# Patient Record
Sex: MALE | Race: WHITE | NOT HISPANIC OR LATINO | Employment: FULL TIME | URBAN - METROPOLITAN AREA
[De-identification: names, ages, dates, MRNs, and addresses within clinical notes are randomized per-mention and may not be internally consistent; named-entity substitution may affect disease eponyms.]

---

## 2023-02-14 PROBLEM — N52.9 ED (ERECTILE DYSFUNCTION): Status: ACTIVE | Noted: 2023-02-14

## 2023-02-14 PROBLEM — K21.9 GERD (GASTROESOPHAGEAL REFLUX DISEASE): Status: ACTIVE | Noted: 2023-02-14

## 2023-02-14 PROBLEM — J06.9 URI (UPPER RESPIRATORY INFECTION): Status: ACTIVE | Noted: 2023-02-14

## 2023-02-14 PROBLEM — S22.49XD: Status: ACTIVE | Noted: 2023-02-14

## 2023-02-14 PROBLEM — I10 BENIGN ESSENTIAL HYPERTENSION: Status: ACTIVE | Noted: 2023-02-14

## 2023-02-14 PROBLEM — G56.22 NEURITIS OF LEFT ULNAR NERVE: Status: ACTIVE | Noted: 2023-02-14

## 2023-02-14 PROBLEM — R19.7 DIARRHEA: Status: ACTIVE | Noted: 2023-02-14

## 2023-02-14 PROBLEM — M23.91 INTERNAL DERANGEMENT OF RIGHT KNEE: Status: ACTIVE | Noted: 2023-02-14

## 2023-02-14 PROBLEM — M19.011 ARTHRITIS OF RIGHT ACROMIOCLAVICULAR JOINT: Status: ACTIVE | Noted: 2023-02-14

## 2023-02-14 PROBLEM — G45.9 TIA (TRANSIENT ISCHEMIC ATTACK): Status: ACTIVE | Noted: 2023-02-14

## 2023-02-14 PROBLEM — R22.30 MASS OF HAND: Status: ACTIVE | Noted: 2023-02-14

## 2023-02-14 PROBLEM — M25.862 MASS OF JOINT OF LEFT KNEE: Status: ACTIVE | Noted: 2023-02-14

## 2023-02-14 PROBLEM — I48.0 PAROXYSMAL ATRIAL FIBRILLATION (MULTI): Status: ACTIVE | Noted: 2023-02-14

## 2023-02-14 PROBLEM — L50.8 URTICARIA, ACUTE: Status: ACTIVE | Noted: 2023-02-14

## 2023-02-14 PROBLEM — M25.562 LEFT KNEE PAIN: Status: ACTIVE | Noted: 2023-02-14

## 2023-02-14 PROBLEM — J01.90 ACUTE SINUSITIS: Status: ACTIVE | Noted: 2023-02-14

## 2023-02-14 PROBLEM — E78.00 HYPERCHOLESTEROLEMIA: Status: ACTIVE | Noted: 2023-02-14

## 2023-02-14 PROBLEM — R91.8 LUNG NODULES: Status: ACTIVE | Noted: 2023-02-14

## 2023-02-14 PROBLEM — G62.9 PERIPHERAL NEUROPATHY: Status: ACTIVE | Noted: 2023-02-14

## 2023-02-14 RX ORDER — LOSARTAN POTASSIUM 100 MG/1
1 TABLET ORAL DAILY
COMMUNITY
Start: 2022-11-03 | End: 2023-04-04 | Stop reason: DRUGHIGH

## 2023-02-14 RX ORDER — OMEPRAZOLE 20 MG/1
CAPSULE, DELAYED RELEASE ORAL
COMMUNITY
Start: 2014-12-02

## 2023-02-14 RX ORDER — ASPIRIN 81 MG/1
1 TABLET ORAL DAILY
COMMUNITY
Start: 2022-09-20 | End: 2024-04-08

## 2023-02-14 RX ORDER — ROSUVASTATIN CALCIUM 20 MG/1
1 TABLET, COATED ORAL DAILY
COMMUNITY
Start: 2022-11-02 | End: 2024-04-08

## 2024-03-12 ENCOUNTER — TELEPHONE (OUTPATIENT)
Dept: PRIMARY CARE | Facility: CLINIC | Age: 59
End: 2024-03-12
Payer: COMMERCIAL

## 2024-03-12 NOTE — TELEPHONE ENCOUNTER
Tommy is scheduled for a visit on 4/8 and wants to know if he can get a refill for Losartan 100-12.5 and Eliquis 5mg to his [facility] pharmacy on file

## 2024-03-13 DIAGNOSIS — I10 BENIGN ESSENTIAL HYPERTENSION: Primary | ICD-10-CM

## 2024-03-13 DIAGNOSIS — I48.0 PAROXYSMAL ATRIAL FIBRILLATION (MULTI): ICD-10-CM

## 2024-03-13 RX ORDER — LOSARTAN POTASSIUM AND HYDROCHLOROTHIAZIDE 12.5; 1 MG/1; MG/1
1 TABLET ORAL DAILY
Qty: 90 TABLET | Refills: 3 | Status: SHIPPED | OUTPATIENT
Start: 2024-03-13 | End: 2025-03-13

## 2024-04-08 ENCOUNTER — OFFICE VISIT (OUTPATIENT)
Dept: PRIMARY CARE | Facility: CLINIC | Age: 59
End: 2024-04-08
Payer: COMMERCIAL

## 2024-04-08 ENCOUNTER — TELEPHONE (OUTPATIENT)
Dept: PRIMARY CARE | Facility: CLINIC | Age: 59
End: 2024-04-08

## 2024-04-08 VITALS
TEMPERATURE: 98.6 F | WEIGHT: 257.2 LBS | SYSTOLIC BLOOD PRESSURE: 138 MMHG | HEART RATE: 70 BPM | DIASTOLIC BLOOD PRESSURE: 76 MMHG | BODY MASS INDEX: 34.88 KG/M2 | OXYGEN SATURATION: 96 %

## 2024-04-08 DIAGNOSIS — I48.0 PAROXYSMAL ATRIAL FIBRILLATION (MULTI): ICD-10-CM

## 2024-04-08 DIAGNOSIS — Z79.899 HIGH RISK MEDICATION USE: ICD-10-CM

## 2024-04-08 DIAGNOSIS — R06.83 SNORES: ICD-10-CM

## 2024-04-08 DIAGNOSIS — G45.9 TIA (TRANSIENT ISCHEMIC ATTACK): ICD-10-CM

## 2024-04-08 DIAGNOSIS — R40.0 DAYTIME SOMNOLENCE: ICD-10-CM

## 2024-04-08 DIAGNOSIS — Z11.4 ENCOUNTER FOR SCREENING FOR HIV: ICD-10-CM

## 2024-04-08 DIAGNOSIS — K22.719 BARRETT'S ESOPHAGUS WITH DYSPLASIA: ICD-10-CM

## 2024-04-08 DIAGNOSIS — I10 BENIGN ESSENTIAL HYPERTENSION: Primary | ICD-10-CM

## 2024-04-08 DIAGNOSIS — N52.2 DRUG-INDUCED ERECTILE DYSFUNCTION: ICD-10-CM

## 2024-04-08 DIAGNOSIS — Z11.59 ENCOUNTER FOR HEPATITIS C SCREENING TEST FOR LOW RISK PATIENT: ICD-10-CM

## 2024-04-08 DIAGNOSIS — K63.5 POLYP OF COLON, UNSPECIFIED PART OF COLON, UNSPECIFIED TYPE: ICD-10-CM

## 2024-04-08 DIAGNOSIS — E78.00 HYPERCHOLESTEROLEMIA: ICD-10-CM

## 2024-04-08 DIAGNOSIS — Z00.00 ANNUAL PHYSICAL EXAM: ICD-10-CM

## 2024-04-08 LAB
ALBUMIN SERPL BCP-MCNC: 4 G/DL (ref 3.4–5)
ALP SERPL-CCNC: 50 U/L (ref 33–136)
ALT SERPL W P-5'-P-CCNC: 25 U/L (ref 10–52)
ANION GAP SERPL CALC-SCNC: 14 MMOL/L (ref 10–20)
APPEARANCE UR: CLEAR
AST SERPL W P-5'-P-CCNC: 20 U/L (ref 9–39)
BILIRUB SERPL-MCNC: 0.6 MG/DL (ref 0–1.2)
BILIRUB UR STRIP.AUTO-MCNC: NEGATIVE MG/DL
BUN SERPL-MCNC: 12 MG/DL (ref 6–23)
CALCIUM SERPL-MCNC: 9.3 MG/DL (ref 8.6–10.6)
CHLORIDE SERPL-SCNC: 105 MMOL/L (ref 98–107)
CHOLEST SERPL-MCNC: 175 MG/DL (ref 0–199)
CHOLESTEROL/HDL RATIO: 4.3
CO2 SERPL-SCNC: 29 MMOL/L (ref 21–32)
COLOR UR: YELLOW
CREAT SERPL-MCNC: 0.89 MG/DL (ref 0.5–1.3)
EGFRCR SERPLBLD CKD-EPI 2021: >90 ML/MIN/1.73M*2
ERYTHROCYTE [DISTWIDTH] IN BLOOD BY AUTOMATED COUNT: 12.8 % (ref 11.5–14.5)
GLUCOSE SERPL-MCNC: 112 MG/DL (ref 74–99)
GLUCOSE UR STRIP.AUTO-MCNC: NORMAL MG/DL
HCT VFR BLD AUTO: 44.9 % (ref 41–52)
HCV AB SER QL: NONREACTIVE
HDLC SERPL-MCNC: 40.8 MG/DL
HGB BLD-MCNC: 15.4 G/DL (ref 13.5–17.5)
HIV 1+2 AB+HIV1 P24 AG SERPL QL IA: NONREACTIVE
KETONES UR STRIP.AUTO-MCNC: NEGATIVE MG/DL
LDLC SERPL CALC-MCNC: 99 MG/DL
LEUKOCYTE ESTERASE UR QL STRIP.AUTO: NEGATIVE
MCH RBC QN AUTO: 33.3 PG (ref 26–34)
MCHC RBC AUTO-ENTMCNC: 34.3 G/DL (ref 32–36)
MCV RBC AUTO: 97 FL (ref 80–100)
MUCOUS THREADS #/AREA URNS AUTO: NORMAL /LPF
NITRITE UR QL STRIP.AUTO: NEGATIVE
NON HDL CHOLESTEROL: 134 MG/DL (ref 0–149)
NRBC BLD-RTO: 0 /100 WBCS (ref 0–0)
PH UR STRIP.AUTO: 7 [PH]
PLATELET # BLD AUTO: 215 X10*3/UL (ref 150–450)
POTASSIUM SERPL-SCNC: 4.1 MMOL/L (ref 3.5–5.3)
PROT SERPL-MCNC: 6.8 G/DL (ref 6.4–8.2)
PROT UR STRIP.AUTO-MCNC: NORMAL MG/DL
PSA SERPL-MCNC: 4.05 NG/ML
RBC # BLD AUTO: 4.62 X10*6/UL (ref 4.5–5.9)
RBC # UR STRIP.AUTO: NEGATIVE /UL
RBC #/AREA URNS AUTO: NORMAL /HPF
SODIUM SERPL-SCNC: 144 MMOL/L (ref 136–145)
SP GR UR STRIP.AUTO: 1.02
TRIGL SERPL-MCNC: 174 MG/DL (ref 0–149)
UROBILINOGEN UR STRIP.AUTO-MCNC: NORMAL MG/DL
VLDL: 35 MG/DL (ref 0–40)
WBC # BLD AUTO: 5.6 X10*3/UL (ref 4.4–11.3)
WBC #/AREA URNS AUTO: NORMAL /HPF

## 2024-04-08 PROCEDURE — 86803 HEPATITIS C AB TEST: CPT

## 2024-04-08 PROCEDURE — 81001 URINALYSIS AUTO W/SCOPE: CPT

## 2024-04-08 PROCEDURE — 80061 LIPID PANEL: CPT

## 2024-04-08 PROCEDURE — 99215 OFFICE O/P EST HI 40 MIN: CPT | Performed by: FAMILY MEDICINE

## 2024-04-08 PROCEDURE — 3078F DIAST BP <80 MM HG: CPT | Performed by: FAMILY MEDICINE

## 2024-04-08 PROCEDURE — 36415 COLL VENOUS BLD VENIPUNCTURE: CPT

## 2024-04-08 PROCEDURE — 3075F SYST BP GE 130 - 139MM HG: CPT | Performed by: FAMILY MEDICINE

## 2024-04-08 PROCEDURE — 84153 ASSAY OF PSA TOTAL: CPT

## 2024-04-08 PROCEDURE — 85027 COMPLETE CBC AUTOMATED: CPT

## 2024-04-08 PROCEDURE — 80053 COMPREHEN METABOLIC PANEL: CPT

## 2024-04-08 PROCEDURE — 87389 HIV-1 AG W/HIV-1&-2 AB AG IA: CPT

## 2024-04-08 RX ORDER — METOPROLOL SUCCINATE 25 MG/1
25 TABLET, EXTENDED RELEASE ORAL DAILY
Qty: 30 TABLET | Refills: 11 | Status: SHIPPED | OUTPATIENT
Start: 2024-04-08 | End: 2025-04-08

## 2024-04-08 RX ORDER — PITAVASTATIN CALCIUM 2.09 MG/1
2 TABLET, FILM COATED ORAL DAILY
Qty: 30 TABLET | Refills: 5 | Status: SHIPPED | OUTPATIENT
Start: 2024-04-08

## 2024-04-08 RX ORDER — SILDENAFIL 100 MG/1
100 TABLET, FILM COATED ORAL DAILY PRN
Qty: 30 TABLET | Refills: 5 | Status: SHIPPED | OUTPATIENT
Start: 2024-04-08 | End: 2025-04-08

## 2024-04-08 ASSESSMENT — PAIN SCALES - GENERAL: PAINLEVEL: 4

## 2024-04-08 ASSESSMENT — ENCOUNTER SYMPTOMS: DEPRESSION: 0

## 2024-04-08 NOTE — PATIENT INSTRUCTIONS
You have 3 new prescriptions  1.  Pitavastatin (Livolo) is the cholesterol pill.  Try to take it every other day for the first month.  If things go well, increase to 1 daily.  See me in 4 months to check your cholesterol  2.  Metoprolol: This is a heart and blood pressure pill.  It may help your anxiety a little  3.  Sildenafil: This is the Viagra.  Can start with one third or half tablets.  You can take a Tylenol when you take it to help minimize headaches that can occur afterwards.

## 2024-04-08 NOTE — PROGRESS NOTES
Subjective   Patient ID: Tommy Kong is a 59 y.o. male who presents for Med Refill (Pt is here for meds refills and review medications. ).    HPI   Occasional palpitations.  Patient has noted periodic spiking of his blood pressures.  Has been off his metoprolol.  He also stopped his cholesterol medication due to myalgias.  Tired a lot.  Snores  Also complains of erectile dysfunction    Review of Systems    Objective   /76 (BP Location: Left arm, Patient Position: Sitting, BP Cuff Size: Adult)   Pulse 70   Temp 37 °C (98.6 °F) (Temporal)   Wt 117 kg (257 lb 3.2 oz)   SpO2 96%   BMI 34.88 kg/m²     Physical Exam  Alert, pleasant and in no acute distress.  Neck: Supple, no JVD or carotid bruit  Heart: Regular rate and rhythm, no murmur  Lungs: Clear to auscultation  Lower extremities: No edema    Assessment/Plan   Problem List Items Addressed This Visit             ICD-10-CM    Benign essential hypertension - Primary I10    Relevant Medications    metoprolol succinate XL (Toprol-XL) 25 mg 24 hr tablet    Other Relevant Orders    Urinalysis with Reflex Microscopic    Comprehensive Metabolic Panel    CBC    Lipid Panel    ED (erectile dysfunction) N52.9    Relevant Medications    sildenafil (Viagra) 100 mg tablet    Hypercholesterolemia E78.00    Relevant Medications    pitavastatin calcium (Livalo) 2 mg tablet    Other Relevant Orders    Urinalysis with Reflex Microscopic    Comprehensive Metabolic Panel    CBC    Lipid Panel    Paroxysmal atrial fibrillation (CMS/HCC) I48.0    Relevant Medications    metoprolol succinate XL (Toprol-XL) 25 mg 24 hr tablet    sildenafil (Viagra) 100 mg tablet    Other Relevant Orders    Urinalysis with Reflex Microscopic    Comprehensive Metabolic Panel    CBC    Lipid Panel    TIA (transient ischemic attack) G45.9    Relevant Orders    Urinalysis with Reflex Microscopic    Comprehensive Metabolic Panel    CBC    Lipid Panel     Other Visit Diagnoses         Codes     Parks's esophagus with dysplasia     K22.719    Relevant Orders    EGD    Snores     R06.83    Relevant Orders    Home sleep apnea test (HSAT)    Daytime somnolence     R40.0    Relevant Orders    Home sleep apnea test (HSAT)    Annual physical exam     Z00.00    Relevant Orders    Prostate Specific Antigen, Screen    Urinalysis with Reflex Microscopic    Comprehensive Metabolic Panel    CBC    Lipid Panel    Polyp of colon, unspecified part of colon, unspecified type     K63.5    Relevant Orders    Colonoscopy Screening; Average Risk Patient    High risk medication use     Z79.899    Relevant Orders    Comprehensive Metabolic Panel    CBC    Encounter for screening for HIV     Z11.4    Relevant Orders    HIV 1/2 Antigen/Antibody Screen with Reflex to Confirmation    Encounter for hepatitis C screening test for low risk patient     Z11.59    Relevant Orders    Hepatitis C antibody          Stressing importance with the patient to comply with taking his medications daily.  Discussed the reason he is on his medication and the importance of preventing complications.  1.  Hypertension: Presently stable.  We are going to get him back on his metoprolol for his atrial fibs as well as more consistent blood pressure control.  This may also help some of his anxiety.  2.  Hypercholesterol: We will give a trial of pitavastatin Livolo).  Patient can start every other day and work up to a full tablet.  If this fails, may need to look at PCSK9 inhibitor  3.  Paroxysmal atrial fibrillation: Appears he has been stable.  Patient has not had any episodes of significant runs of arrhythmia.  He is taking his Eliquis daily  4.  Parks's esophagus/colon polyps: Referral for EGD and colonoscopy  5.  Snoring/daytime somnolence: We will get a home sleep study  6.  TIA history: Stressed the importance of medication compliance to prevent further episodes.  7.  Erectile dysfunction: Discussed treatments available.  Will trial Viagra.  Dosage  and administration education provided.  Health maintenance: Plan to address vaccines at next visit.  HIV and hepatitis C screens ordered.  Will call patient in 3 to 5 days with lab results.  Follow-up in 4 months  Next: vaccine update

## 2024-04-08 NOTE — TELEPHONE ENCOUNTER
Pt was just in today & luciana said that he would need clarification for jack   Please advise  Ty

## 2024-05-31 ENCOUNTER — PROCEDURE VISIT (OUTPATIENT)
Dept: SLEEP MEDICINE | Facility: CLINIC | Age: 59
End: 2024-05-31
Payer: COMMERCIAL

## 2024-05-31 DIAGNOSIS — R06.83 SNORES: ICD-10-CM

## 2024-05-31 DIAGNOSIS — G47.33 OBSTRUCTIVE SLEEP APNEA (ADULT) (PEDIATRIC): ICD-10-CM

## 2024-05-31 DIAGNOSIS — G47.37 CENTRAL SLEEP APNEA IN CONDITIONS CLASSIFIED ELSEWHERE: ICD-10-CM

## 2024-05-31 DIAGNOSIS — R40.0 DAYTIME SOMNOLENCE: ICD-10-CM

## 2024-05-31 PROCEDURE — 95806 SLEEP STUDY UNATT&RESP EFFT: CPT | Performed by: PSYCHIATRY & NEUROLOGY

## 2024-05-31 NOTE — PROGRESS NOTES
Type of Study: HOME SLEEP STUDY - NOMAD     The patient received equipment and instructions for use of the [facility] Nomad HSAT device. The patient was instructed how to apply the effort belts, cannula, thermistor. It was also explained how the Nomad and oximeter components work.  The patient was asked to record their sleep for an 8-hour period.     The patient was informed of their responsibility for the device and acknowledged this by signing the HSAT device contract. The patient was asked to return the device on 6/3/2024 between the hours of 6:30 AM- 6:30 PM to the Sleep Center.     The patient was instructed to call 911 as usual for any medical- emergencies while at home.  The patient was also given a phone number for troubleshooting when using the device in case there were additional questions.

## 2024-06-06 ASSESSMENT — SLEEP AND FATIGUE QUESTIONNAIRES
HOW LIKELY ARE YOU TO NOD OFF OR FALL ASLEEP WHILE SITTING QUIETLY AFTER LUNCH WITHOUT ALCOHOL: SLIGHT CHANCE OF DOZING
SITING INACTIVE IN A PUBLIC PLACE LIKE A CLASS ROOM OR A MOVIE THEATER: SLIGHT CHANCE OF DOZING
HOW LIKELY ARE YOU TO NOD OFF OR FALL ASLEEP WHILE LYING DOWN TO REST IN THE AFTERNOON WHEN CIRCUMSTANCES PERMIT: SLIGHT CHANCE OF DOZING
HOW LIKELY ARE YOU TO NOD OFF OR FALL ASLEEP WHEN YOU ARE A PASSENGER IN A CAR FOR AN HOUR WITHOUT A BREAK: WOULD NEVER DOZE
HOW LIKELY ARE YOU TO NOD OFF OR FALL ASLEEP IN A CAR, WHILE STOPPED FOR A FEW MINUTES IN TRAFFIC: WOULD NEVER DOZE
HOW LIKELY ARE YOU TO NOD OFF OR FALL ASLEEP WHILE WATCHING TV: SLIGHT CHANCE OF DOZING
HOW LIKELY ARE YOU TO NOD OFF OR FALL ASLEEP WHILE SITTING AND TALKING TO SOMEONE: WOULD NEVER DOZE
HOW LIKELY ARE YOU TO NOD OFF OR FALL ASLEEP WHILE SITTING AND READING: SLIGHT CHANCE OF DOZING
ESS-CHAD TOTAL SCORE: 5

## 2024-06-10 DIAGNOSIS — G47.33 OSA (OBSTRUCTIVE SLEEP APNEA): Primary | ICD-10-CM

## 2024-06-28 ENCOUNTER — APPOINTMENT (OUTPATIENT)
Dept: SLEEP MEDICINE | Facility: CLINIC | Age: 59
End: 2024-06-28
Payer: COMMERCIAL

## 2024-06-28 VITALS
SYSTOLIC BLOOD PRESSURE: 162 MMHG | OXYGEN SATURATION: 98 % | HEIGHT: 72 IN | HEART RATE: 77 BPM | DIASTOLIC BLOOD PRESSURE: 72 MMHG | WEIGHT: 255 LBS | TEMPERATURE: 98.6 F | BODY MASS INDEX: 34.54 KG/M2

## 2024-06-28 DIAGNOSIS — I10 BENIGN ESSENTIAL HYPERTENSION: ICD-10-CM

## 2024-06-28 DIAGNOSIS — E66.09 CLASS 1 OBESITY DUE TO EXCESS CALORIES WITH SERIOUS COMORBIDITY AND BODY MASS INDEX (BMI) OF 34.0 TO 34.9 IN ADULT: ICD-10-CM

## 2024-06-28 DIAGNOSIS — G47.39 MIXED SLEEP APNEA: Primary | ICD-10-CM

## 2024-06-28 PROCEDURE — 3077F SYST BP >= 140 MM HG: CPT | Performed by: PSYCHIATRY & NEUROLOGY

## 2024-06-28 PROCEDURE — 3008F BODY MASS INDEX DOCD: CPT | Performed by: PSYCHIATRY & NEUROLOGY

## 2024-06-28 PROCEDURE — 99204 OFFICE O/P NEW MOD 45 MIN: CPT | Performed by: PSYCHIATRY & NEUROLOGY

## 2024-06-28 PROCEDURE — 3078F DIAST BP <80 MM HG: CPT | Performed by: PSYCHIATRY & NEUROLOGY

## 2024-06-28 ASSESSMENT — SLEEP AND FATIGUE QUESTIONNAIRES
HOW LIKELY ARE YOU TO NOD OFF OR FALL ASLEEP WHILE SITTING QUIETLY AFTER LUNCH WITHOUT ALCOHOL: MODERATE CHANCE OF DOZING
HOW LIKELY ARE YOU TO NOD OFF OR FALL ASLEEP WHILE WATCHING TV: SLIGHT CHANCE OF DOZING
ESS-CHAD TOTAL SCORE: 8
HOW LIKELY ARE YOU TO NOD OFF OR FALL ASLEEP WHEN YOU ARE A PASSENGER IN A CAR FOR AN HOUR WITHOUT A BREAK: WOULD NEVER DOZE
HOW LIKELY ARE YOU TO NOD OFF OR FALL ASLEEP WHILE SITTING AND READING: MODERATE CHANCE OF DOZING
HOW LIKELY ARE YOU TO NOD OFF OR FALL ASLEEP WHILE SITTING AND TALKING TO SOMEONE: WOULD NEVER DOZE
HOW LIKELY ARE YOU TO NOD OFF OR FALL ASLEEP WHILE LYING DOWN TO REST IN THE AFTERNOON WHEN CIRCUMSTANCES PERMIT: MODERATE CHANCE OF DOZING
HOW LIKELY ARE YOU TO NOD OFF OR FALL ASLEEP IN A CAR, WHILE STOPPED FOR A FEW MINUTES IN TRAFFIC: WOULD NEVER DOZE
SITING INACTIVE IN A PUBLIC PLACE LIKE A CLASS ROOM OR A MOVIE THEATER: SLIGHT CHANCE OF DOZING

## 2024-06-28 NOTE — PROGRESS NOTES
Patient: Tommy Kong    [id number]  : 1965 -- AGE 59 y.o.    Provider: Gerardo Chaudhary MD     [facility]   Service Date: 2024              [facility]  New Visit Note        The patient's referring provider is: Mitesh Pabon DO    HPI:  Tommy Kong is a 59 y.o. male with PMH notable for HTN, paroxysmal atrial fibrillation, HLD, GERD, TIA (), peripheral neuropathy, and lung nodule, who presents today following a home sleep study that shows mixed sleep apnea that is severe.    He complains of about 2 years of non-restorative sleep since his TIA (caused 2 minutes of left arm and leg paralysis). Found to be very hypertensive in the ED.     Did not do a sleep study then when it was recommended, but was recently urged to be tested.    NIGHTTIME SYMPTOMS:   Snoring: yes, for the last few years worsening with age, worse when supine. Occasional snoring causes an awakening  Witnessed apnea: unknown  Nocturnal gasping: No  Nocturnal choking: No  Sleep walking: No  Sleep talking:  No  Dream enactment: No  Bruxism: No  Nocturnal excessive sweating: occasional  Nocturnal GERD: No  Morning headaches: No  Morning dry mouth/sore throat: often  Nocturia: 1x/night  Restless sleep: yes - due to arm pains usually, hip pain  Falls from bed during sleep: No  Sleep paralysis: No  Hypnagogic/hypnopompic hallucinations: No    DAYTIME SYMPTOMS  [place]: 8/24  Daytime sleepiness: after a meal and after work shift  Fatigue: after work shift  Trouble with memory/concentration: no  Dozing: when inactive he will let himself doze such as when watching TV  Feeling sleepy while driving: Denies    RLS symptoms: no     Cataplexy: No    SLEEP HABITS:   Preferred sleep position: side or prone  Bedtime: 10 pm on weekdays, 11 pm to 12 am on weekends, sleep latency within a few minutes  Wake time: 530 am for work, 8-9 am on weekends  # of nocturnal awakenings: frequent as  above  Napping: on weekends in early-mid afternoon for 45 min to 1.5 h. Napping is refreshing  Total estimated sleep per 24 hrs: 6 hours    PRIOR SLEEP STUDIES:  Home sleep study 6/2/2024: Weight 116.0 kg, BMI 34.7.  Study shows severe sleep apnea that is primarily obstructive, though there are some central features particularly when supine.  REI3% 45/h, REI4% 36/h.  By 3% scoring the JUSTIN was 75 when supine and 33 when non-supine.  Mean SpO2 93%, mary 78% with 20 minutes at or below 88%.  Of note, the signal from the abdominal respiratory belt was strong, but weak from the chest belt.  -Raw PSG data, report and hypnogram reviewed personally by me today.    Treatment options were reviewed. He is open to CPAP.    Would like to work on losing weight.    PRIOR TREATMENTS:  No stimulants or sleep aids.         Patient Active Problem List   Diagnosis    Acute sinusitis    Arthritis of right acromioclavicular joint    Benign essential hypertension    Closed fracture of eight or more ribs with routine healing    Diarrhea    GERD (gastroesophageal reflux disease)    ED (erectile dysfunction)    Hypercholesterolemia    Internal derangement of right knee    Left knee pain    Lung nodules    Mass of hand    Mass of joint of left knee    Neuritis of left ulnar nerve    Paroxysmal atrial fibrillation (Multi)    Peripheral neuropathy    TIA (transient ischemic attack)    URI (upper respiratory infection)    Urticaria, acute     Past Medical History:   Diagnosis Date    Achilles tendinitis, unspecified leg     Achilles tendinitis    Multiple fractures of ribs, left side, subsequent encounter for fracture with routine healing 09/15/2021    Closed fracture of eight or more ribs of left side with routine healing, subsequent encounter    Strain of unspecified muscle, fascia and tendon at shoulder and upper arm level, right arm, initial encounter     Right shoulder strain     Past Surgical History:   Procedure Laterality Date    CT  HEAD ANGIO W AND WO IV CONTRAST  2022    CT HEAD ANGIO W AND WO IV CONTRAST 2022 [facility]    MR HEAD ANGIO WO IV CONTRAST  2022    MR HEAD ANGIO WO IV CONTRAST 2022 [facility]    MR NECK ANGIO WO IV CONTRAST  2022    MR NECK ANGIO WO IV CONTRAST 2022 [facility]    TONSILLECTOMY  2014    Tonsillectomy     Current Outpatient Medications   Medication Sig Dispense Refill    apixaban (Eliquis) 5 mg tablet Take 1 tablet (5 mg) by mouth 2 times a day. 180 tablet 3    losartan-hydrochlorothiazide (Hyzaar) 100-12.5 mg tablet Take 1 tablet by mouth once daily. 90 tablet 3    metoprolol succinate XL (Toprol-XL) 25 mg 24 hr tablet Take 1 tablet (25 mg) by mouth once daily. 30 tablet 11    omeprazole (PriLOSEC) 20 mg DR capsule Take by mouth in the morning.      pitavastatin calcium (Livalo) 2 mg tablet Take 2 mg by mouth once daily. 30 tablet 5    sildenafil (Viagra) 100 mg tablet Take 1 tablet (100 mg) by mouth once daily as needed for erectile dysfunction. 30 tablet 5     No current facility-administered medications for this visit.     No Known Allergies    Family History   Problem Relation Name Age of Onset    Stroke Mother          multiple strokes,  at 88 yo    Heart attack Father           around 61 yo       SOCIAL HISTORY  Employment: /maintenance  Lives with: wife, mother-in-law, brother-in-law (has mental illness)  Alcohol: 2 beers/evening   Cigarettes: smoked for about 20 years from about 15 yo mid-late 30s - 1 ppd, then vaped occasionally, now has occasional cigar on weekends  Illicits: none  Caffeine: 1 cup of coffee/morning, about 2 servings of iced in the daytime/evening     ROS: 12 point ROS positive for fatigue, SOB with exercise, chronic cough, palpitations, and muscle pain/cramps.  All other items/systems were reviewed and are negative.    PHYSICAL EXAMINATION:   Vitals:    24 1256   BP: 162/72   Pulse: 77    Temp: 37 °C (98.6 °F)   SpO2: 98%   Weight: 116 kg (255 lb)   Height: 1.829 m (6')     Body mass index is 34.58 kg/m².  General: Awake. Alert. Comfortable. No apparent distress.   Speech: Normal  Comprehension: Normal  Mood: Stable  Affect: Appropriate  Eyes:   Eyelids: normal            ENT:          Nares  patent bilaterally. Septum deviation absent. Dominguez tongue position IV. Tongue scalloping is present, tongue is enlarged, soft palate is elongated, hard palate is not high arched. Uvula is not enlarged. Retrognathia is not present. Tonsils are absent. Dentition very good.           Neck:          Circumference: increased in caliber  Cardiac: Regular in rate and rhythm. No murmurs. No edema in bilateral lower extremities.  Pul:         Clear to auscultation bilaterally. Normal respiratory effort   Abd:         obese  Neuro: Alert, well-oriented. Cranial nerves II-XII grossly normal and symmetric.  Moves all limbs symmetrically with no evidence of significant focal weakness. No abnormal movements noted. Normal gait          LABS/DIAGNOSTICS:  Lab Results   Component Value Date    HGB 15.4 04/08/2024    CO2 29 04/08/2024    HGBA1C 5.3 09/11/2022        Echo 9/12/2022: LVEF normal at 60-65%.  Mild to moderate concentric LVH.  Spectral Doppler shows pseudo normal pattern of LV diastolic filling.  Left atrium upper limits of normal in size.        ASSESSMENT AND PLAN: Mr. Tommy Kong is a 59 y.o. male who was found to have severe sleep apnea on home sleep testing (obstructive primarily, but appears to have mostly central when supine), with HTN, paroxysmal atrial fibrillation, HLD, GERD, TIA (9/22), peripheral neuropathy, obesity, and lung nodule.     #mixed sleep apnea  -We discussed the risk factors for sleep apnea, pathophysiology of sleep apnea, treatment options, and potential long-term complications of untreated BHARGAV, including cardiovascular and metabolic complications. We will start treatment with  autoCPAP 4-12 cm H2O, DME: [facility] ([facility]). Insurance compliance requirements were reviewed. CPAP setup instruction packet provided.  -pt prefers to sleep off-supine. I encouraged this  -consider in-lab CPAP titration after reviewing one month of CPAP download data    #obesity  -weight loss strongly encouraged    #HTN  -management per PCP is appreciated      All of the above was discussed with the patient in detail. He voiced an understanding of the above and was agreeable to proceed further as advised    FOLLOW UP:  7 weeks for CPAP follow up

## 2024-08-09 ENCOUNTER — APPOINTMENT (OUTPATIENT)
Dept: PRIMARY CARE | Facility: CLINIC | Age: 59
End: 2024-08-09
Payer: COMMERCIAL

## 2025-07-24 ENCOUNTER — APPOINTMENT (OUTPATIENT)
Dept: PRIMARY CARE | Facility: CLINIC | Age: 60
End: 2025-07-24
Payer: COMMERCIAL

## 2025-07-24 VITALS — SYSTOLIC BLOOD PRESSURE: 144 MMHG | DIASTOLIC BLOOD PRESSURE: 80 MMHG | WEIGHT: 235 LBS | BODY MASS INDEX: 31.87 KG/M2

## 2025-07-24 DIAGNOSIS — Z00.00 HEALTHCARE MAINTENANCE: Primary | ICD-10-CM

## 2025-07-24 DIAGNOSIS — I10 BENIGN ESSENTIAL HYPERTENSION: ICD-10-CM

## 2025-07-24 DIAGNOSIS — M54.9 BACK PAIN, UNSPECIFIED BACK LOCATION, UNSPECIFIED BACK PAIN LATERALITY, UNSPECIFIED CHRONICITY: ICD-10-CM

## 2025-07-24 DIAGNOSIS — M25.512 LEFT SHOULDER PAIN, UNSPECIFIED CHRONICITY: ICD-10-CM

## 2025-07-24 DIAGNOSIS — G47.33 OSA (OBSTRUCTIVE SLEEP APNEA): ICD-10-CM

## 2025-07-24 DIAGNOSIS — I48.0 PAROXYSMAL ATRIAL FIBRILLATION (MULTI): ICD-10-CM

## 2025-07-24 DIAGNOSIS — L98.9 SKIN LESIONS: ICD-10-CM

## 2025-07-24 DIAGNOSIS — I73.9 CLAUDICATION: ICD-10-CM

## 2025-07-24 DIAGNOSIS — R20.2 PARESTHESIAS: ICD-10-CM

## 2025-07-24 DIAGNOSIS — Z12.11 SCREEN FOR COLON CANCER: ICD-10-CM

## 2025-07-24 PROCEDURE — 3079F DIAST BP 80-89 MM HG: CPT | Performed by: INTERNAL MEDICINE

## 2025-07-24 PROCEDURE — 99205 OFFICE O/P NEW HI 60 MIN: CPT | Performed by: INTERNAL MEDICINE

## 2025-07-24 PROCEDURE — 3077F SYST BP >= 140 MM HG: CPT | Performed by: INTERNAL MEDICINE

## 2025-07-24 RX ORDER — METOPROLOL SUCCINATE 25 MG/1
25 TABLET, EXTENDED RELEASE ORAL DAILY
Qty: 30 TABLET | Refills: 1 | Status: SHIPPED | OUTPATIENT
Start: 2025-07-24 | End: 2026-07-24

## 2025-07-24 RX ORDER — LOSARTAN POTASSIUM AND HYDROCHLOROTHIAZIDE 12.5; 1 MG/1; MG/1
1 TABLET ORAL DAILY
Qty: 90 TABLET | Refills: 1 | Status: SHIPPED | OUTPATIENT
Start: 2025-07-24 | End: 2026-07-24

## 2025-07-24 ASSESSMENT — PATIENT HEALTH QUESTIONNAIRE - PHQ9
SUM OF ALL RESPONSES TO PHQ9 QUESTIONS 1 AND 2: 0
1. LITTLE INTEREST OR PLEASURE IN DOING THINGS: NOT AT ALL
2. FEELING DOWN, DEPRESSED OR HOPELESS: NOT AT ALL

## 2025-07-24 NOTE — PROGRESS NOTES
Subjective   Patient ID: Tommy Kong is a 60 y.o. male who presents for Establish Care and Med Refill.  HPI  Past medical history severe BHARGAV TIA hypertension remote fall with rib fractures    Presents with wife Galilea          Patient has severe debilitating paresthesias of the feet for many years tingling grade 7 out of 10 nothing makes better worse worse on the bottom of the feet he states  Feels cold sometimes on the feet  Occasional cramping of the legs as well  Does have some chronic back pain lumbar thoracic  Denies skin color changes loss of movement trauma swelling redness    Patient states he has been rationing his blood pressure pills has not been taking them regularly        Health Maintenance:      Colonoscopy: 2018 due for repeat 6 to 12 months per GI      Mammogram:      Pelvic/Pap:      Low dose chest CT:      Aorta duplex:      Optho:      Podiatry:        Vaccines:      Refer to Epic Vaccination Log        ROS:      General: Sluggish fatigue for months not using a CPAP does not sleep well denies fever/chills/weight loss      Head: denies HA/trauma/masses/dizziness      Eyes: denies vision change/loss of vision/blurry vision/diplopia/eye pain      Ears: denies hearing loss/tinnitus/otalgia/otorrhea      Nose: Occasional allergies nasal drainage denies nasal drainage/anosmia      Throat: denies dysphagia/odynophagia      Lymphatics: denies lymph node swelling      Breast: denies masses/discharge/dimpling/skin changes      Cardiac: denies CP/palpitations/orthopnea/PND      Pulmonary: Occasionally feels some dyspnea in the extreme heat such as 103 degree weather otherwise does not none at present denies dyspnea/cough/wheezing      GI: denies abd pain/n/v/diarrhea/melena/hematochezia/hematemesis      : denies dysuria/hematuria/change frequency      Genital: denies genital discharge/lesions      Skin: Some spots on the legs denies rashes/lesions/masses      MSK: Back pain 3 weeks mid back low back  "left shoulder pain arthralgia with decreased range of motion ;bilateral feet pain mostly in the bottom of the feet denies weakness/swelling/edema/gait imbalance/pain      Neuro: Painful paresthesias lower extremity for years denies paresthesias/seizures/dysarthria      Psych: Occasional increased emotional since having TIA years ago denies depression/anxiety/suicidal or homicidal ideations            Objective   BP (!) 183/79   Wt 107 kg (235 lb)   BMI 31.87 kg/m²      Physical Exam:     General: AO3, NAD     Head: atraumatic/NC     Eyes: EOMI/PERRLA. Negative APD     Ears: TM pearly gray, EAC clear. No lesions or erythema     Nose: symmetric nares, no discharge     Throat: trachea midline, uvula midline pink mucosa. No thyromegaly     Lymphatics: no cervical/supraclavicular/ant or posterior cervical adenopathy/axillary/inguinal adenopathy     Breast: not examined     Chest: no deformity or tenderness to palpation     Pulm: CTA b/l, no wheeze/rhonchi/rales. nonlabored     Cardiac: RRR +s1s2, no m/r/g.      GI: soft, NT/ND. Normoactive Bsx4. No rebound/guarding.     Rectal: not examined     Genital: not examined     MSK: Positive painful arc on the left 5/5 strength UE LE. No edema/clubbing/cyanosis     Skin: Decreased hair distal true leg bilateral ; stuck on appearance nevi left true leg 2 mm no rashes/lesions     Vascular 1+ palpable DP PT on the right: 2+ palp DP PT radials b/l. Negative carotid bruit     Neuro: CNII-XII intact. No focal deficits. Reflexes 2/4 brachioradialis bicep tricep patellar achilles. Finger to nose intact.     Psych: appropriate mood/affect                    No results found for: \"BMPR1A\", \"CBCDIF\"    Patient deferred physical therapy   Assessment/Plan   Diagnoses and all orders for this visit:  Healthcare maintenance  -     CBC and Auto Differential; Future  -     Comprehensive Metabolic Panel; Future  -     Hemoglobin A1C; Future  -     Lipid Panel; Future  -     Thyroxine, Free; " Future  -     Thyroid Stimulating Hormone; Future  -     Vitamin D 25 hydroxy; Future  -     Vitamin B12; Future  -     Folate; Future  -     Prostate Specific Antigen, Screen; Future  Paroxysmal atrial fibrillation (Multi)  -     apixaban (Eliquis) 5 mg tablet; Take 1 tablet (5 mg) by mouth 2 times a day.  -     CBC and Auto Differential; Future  -     Comprehensive Metabolic Panel; Future  -     Hemoglobin A1C; Future  -     Lipid Panel; Future  -     Thyroxine, Free; Future  -     Thyroid Stimulating Hormone; Future  -     Vitamin D 25 hydroxy; Future  -     Vitamin B12; Future  -     Folate; Future  Benign essential hypertension  -     losartan-hydrochlorothiazide (Hyzaar) 100-12.5 mg tablet; Take 1 tablet by mouth once daily.  -     metoprolol succinate XL (Toprol-XL) 25 mg 24 hr tablet; Take 1 tablet (25 mg) by mouth once daily.  -     CBC and Auto Differential; Future  -     Comprehensive Metabolic Panel; Future  -     Hemoglobin A1C; Future  -     Lipid Panel; Future  -     Thyroxine, Free; Future  -     Thyroid Stimulating Hormone; Future  -     Vitamin D 25 hydroxy; Future  -     Vitamin B12; Future  -     Folate; Future  Screen for colon cancer  -     Colonoscopy Screening; Average Risk Patient; Future  BHARGAV (obstructive sleep apnea)  -     Referral to Adult Sleep Medicine; Future  Paresthesias  Comments:  Rule out diabetic neuropathy lumbar radiculopathy versus other  Orders:  -     CBC and Auto Differential; Future  -     Comprehensive Metabolic Panel; Future  -     Hemoglobin A1C; Future  -     Lipid Panel; Future  -     Thyroxine, Free; Future  -     Thyroid Stimulating Hormone; Future  -     Vitamin D 25 hydroxy; Future  -     Vitamin B12; Future  -     Folate; Future  -     EMG & nerve conduction; Future  -     Vascular US ankle brachial index (ARI) without exercise; Future  -     Referral to Podiatry; Future  Claudication  Comments:  Rule out PVD  Orders:  -     CBC and Auto Differential; Future  -      Comprehensive Metabolic Panel; Future  -     Hemoglobin A1C; Future  -     Lipid Panel; Future  -     Thyroxine, Free; Future  -     Thyroid Stimulating Hormone; Future  -     Vitamin D 25 hydroxy; Future  -     Vitamin B12; Future  -     Folate; Future  -     Vascular US ankle brachial index (ARI) without exercise; Future  Back pain, unspecified back location, unspecified back pain laterality, unspecified chronicity  Comments:  Likely osteoarthritis intermixed with muscle spasm  Orders:  -     XR thoracic spine 2 views; Future  -     XR lumbar spine 2-3 views; Future  Left shoulder pain, unspecified chronicity  Comments:  Suspect osteoarthritis rule out superimposed rotator cuff tear strain  Orders:  -     XR shoulder left 2+ views; Future  Skin lesions  Comments:  Suspect seborrheic keratosis  Orders:  -     Referral to Dermatology    Weight loss encouraged  Increase exercise 30 minutes a day  Yoga stretching        Go to the ER for any rest pain of the legs or skin color changes    Over-the-counter Tylenol as needed for pain    Thank you for making an appointment today Tommy    Please stop at the  as we discussed to schedule a 6-month follow-up    Ashwin Angulo DO, FACVIDYA Angulo DO

## 2026-01-23 ENCOUNTER — APPOINTMENT (OUTPATIENT)
Dept: PRIMARY CARE | Facility: CLINIC | Age: 61
End: 2026-01-23
Payer: COMMERCIAL